# Patient Record
Sex: FEMALE | Race: OTHER | NOT HISPANIC OR LATINO | URBAN - METROPOLITAN AREA
[De-identification: names, ages, dates, MRNs, and addresses within clinical notes are randomized per-mention and may not be internally consistent; named-entity substitution may affect disease eponyms.]

---

## 2018-05-19 ENCOUNTER — EMERGENCY (EMERGENCY)
Facility: HOSPITAL | Age: 23
LOS: 1 days | Discharge: HOME | End: 2018-05-19
Attending: EMERGENCY MEDICINE
Payer: MEDICAID

## 2018-05-19 VITALS
HEART RATE: 71 BPM | OXYGEN SATURATION: 100 % | SYSTOLIC BLOOD PRESSURE: 104 MMHG | DIASTOLIC BLOOD PRESSURE: 61 MMHG | RESPIRATION RATE: 18 BRPM | TEMPERATURE: 99 F

## 2018-05-19 VITALS — HEIGHT: 60 IN | WEIGHT: 130.07 LBS

## 2018-05-19 PROCEDURE — 99283 EMERGENCY DEPT VISIT LOW MDM: CPT

## 2018-05-19 RX ORDER — DEXAMETHASONE 0.5 MG/5ML
10 ELIXIR ORAL ONCE
Qty: 0 | Refills: 0 | Status: COMPLETED | OUTPATIENT
Start: 2018-05-19 | End: 2018-05-19

## 2018-05-19 RX ADMIN — Medication 10 MILLIGRAM(S): at 21:53

## 2018-05-19 RX ADMIN — Medication 1 TABLET(S): at 21:52

## 2018-05-19 NOTE — ED PROVIDER NOTE - OBJECTIVE STATEMENT
21 y/o F, presents to the ED c/o throat pain and swelling, onset 2 days ago.  Pt states that pain is worsening and radiating to her ear.  Pt states that she gets strep throat often and current sx are similar.  denies fever. denies HA or neck pain. no chest pain or sob. no abd pain. no n/v/d. no urinary f/u/d. no back pain. no motor or sensory deficits. denies illicit drug use. no recent travel. no rash. no other acute issues symptoms or concerns

## 2019-01-21 ENCOUNTER — RESULT REVIEW (OUTPATIENT)
Age: 24
End: 2019-01-21

## 2021-02-25 PROBLEM — Z00.00 ENCOUNTER FOR PREVENTIVE HEALTH EXAMINATION: Status: ACTIVE | Noted: 2021-02-25

## 2021-02-27 ENCOUNTER — APPOINTMENT (OUTPATIENT)
Dept: ORTHOPEDIC SURGERY | Facility: CLINIC | Age: 26
End: 2021-02-27
Payer: COMMERCIAL

## 2021-02-27 VITALS — TEMPERATURE: 96.3 F

## 2021-02-27 VITALS
HEART RATE: 93 BPM | TEMPERATURE: 98.1 F | HEIGHT: 61 IN | BODY MASS INDEX: 25.49 KG/M2 | SYSTOLIC BLOOD PRESSURE: 101 MMHG | WEIGHT: 135 LBS | DIASTOLIC BLOOD PRESSURE: 63 MMHG

## 2021-02-27 DIAGNOSIS — M24.812 OTHER SPECIFIC JOINT DERANGEMENTS OF LEFT SHOULDER, NOT ELSEWHERE CLASSIFIED: ICD-10-CM

## 2021-02-27 DIAGNOSIS — Z86.2 PERSONAL HISTORY OF DISEASES OF THE BLOOD AND BLOOD-FORMING ORGANS AND CERTAIN DISORDERS INVOLVING THE IMMUNE MECHANISM: ICD-10-CM

## 2021-02-27 DIAGNOSIS — Z78.9 OTHER SPECIFIED HEALTH STATUS: ICD-10-CM

## 2021-02-27 DIAGNOSIS — Z86.59 PERSONAL HISTORY OF OTHER MENTAL AND BEHAVIORAL DISORDERS: ICD-10-CM

## 2021-02-27 DIAGNOSIS — S49.92XA UNSPECIFIED INJURY OF LEFT SHOULDER AND UPPER ARM, INITIAL ENCOUNTER: ICD-10-CM

## 2021-02-27 DIAGNOSIS — Z82.61 FAMILY HISTORY OF ARTHRITIS: ICD-10-CM

## 2021-02-27 DIAGNOSIS — Z82.49 FAMILY HISTORY OF ISCHEMIC HEART DISEASE AND OTHER DISEASES OF THE CIRCULATORY SYSTEM: ICD-10-CM

## 2021-02-27 PROCEDURE — 99204 OFFICE O/P NEW MOD 45 MIN: CPT

## 2021-02-27 PROCEDURE — 99072 ADDL SUPL MATRL&STAF TM PHE: CPT

## 2021-02-27 NOTE — REASON FOR VISIT
[Initial Visit] : an initial visit for [FreeTextEntry2] : Left shoulder pain/injury, acute on chronic.  Pain for over 1 year.

## 2021-02-27 NOTE — PHYSICAL EXAM
[de-identified] : Laterality: Left shoulder\par \par General: Alert and oriented x3.  In no acute distress.  Pleasant in nature with a normal affect.  No apparent respiratory distress. \par Erythema, Warmth, Rubor: Negative\par Swelling: Negative\par \par ROM: (Patient has decreased range of motion in all planes of the shoulder and is guarding during range of motion examination)\par FF: 150 degrees\par ER: 65 degrees\par Abduction: 140 degrees\par IR: Normal\par IR behind back: L5\par \par Special Test:\par Empty Can Sign: Positive.  The patient has severe pain while performing this test.  The patient has severe weakness as well.\par [place]'s Sign: Positive.  The patient has severe pain while performing this test.\par Anderson/Neer Sign: Negative\par Speed's Sign: Negative\par Yergason's Sign: Negative\par Apprehension/Relocation: Negative\par Sulcus Sign: Negative\par Cross Arm Adduction/Pain over AC-J: Negative\par Belly Press/Lift Off Behind Back: Negative\par \par Neurovascularly intact motor and sensory [de-identified] : X-rays of the left shoulder reviewed, 2/27/2021: Negative x-rays of the left shoulder.  No fractures present.

## 2021-02-27 NOTE — DISCUSSION/SUMMARY
[de-identified] : Assessment: Left shoulder injury, trauma.\par \par Plan:\par #1.  MRI of the left shoulder ordered to evaluate for traumatic rotator cuff tear.  The patient has tried and failed conservative management.  The patient has had shoulder pain and injury for over 1 year.\par #2.  Continue with over-the-counter anti-inflammatories and pain medications as needed.\par #3.  Hold off on physical therapy at this time.  I want to evaluate the rotator cuff on MRI before performing physical activities shoulder.  I do want her to continue with range of motion at home as I do not want her to get stiff and become frozen.\par #4.  Continue ice and heat therapy.\par #5.  Referral to meet with sports medicine, Dr. Arteaga, post MRI to review.\par #6.  All of her questions were answered.  She understood the treatment course at this time.
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
## 2021-03-12 ENCOUNTER — OUTPATIENT (OUTPATIENT)
Dept: OUTPATIENT SERVICES | Facility: HOSPITAL | Age: 26
LOS: 1 days | End: 2021-03-12
Payer: COMMERCIAL

## 2021-03-12 ENCOUNTER — APPOINTMENT (OUTPATIENT)
Dept: MRI IMAGING | Facility: CLINIC | Age: 26
End: 2021-03-12
Payer: COMMERCIAL

## 2021-03-12 DIAGNOSIS — M24.812 OTHER SPECIFIC JOINT DERANGEMENTS OF LEFT SHOULDER, NOT ELSEWHERE CLASSIFIED: ICD-10-CM

## 2021-03-12 DIAGNOSIS — M25.512 PAIN IN LEFT SHOULDER: ICD-10-CM

## 2021-03-12 PROCEDURE — 73221 MRI JOINT UPR EXTREM W/O DYE: CPT | Mod: 26,LT

## 2021-03-12 PROCEDURE — 73221 MRI JOINT UPR EXTREM W/O DYE: CPT

## 2021-03-15 ENCOUNTER — APPOINTMENT (OUTPATIENT)
Dept: ORTHOPEDIC SURGERY | Facility: CLINIC | Age: 26
End: 2021-03-15

## 2021-04-05 ENCOUNTER — APPOINTMENT (OUTPATIENT)
Dept: ORTHOPEDIC SURGERY | Facility: CLINIC | Age: 26
End: 2021-04-05

## 2021-04-26 ENCOUNTER — APPOINTMENT (OUTPATIENT)
Dept: ORTHOPEDIC SURGERY | Facility: CLINIC | Age: 26
End: 2021-04-26
Payer: COMMERCIAL

## 2021-04-26 VITALS
HEIGHT: 61 IN | WEIGHT: 135 LBS | SYSTOLIC BLOOD PRESSURE: 96 MMHG | HEART RATE: 63 BPM | BODY MASS INDEX: 25.49 KG/M2 | DIASTOLIC BLOOD PRESSURE: 59 MMHG

## 2021-04-26 DIAGNOSIS — M25.512 PAIN IN LEFT SHOULDER: ICD-10-CM

## 2021-04-26 DIAGNOSIS — Z78.9 OTHER SPECIFIED HEALTH STATUS: ICD-10-CM

## 2021-04-26 PROCEDURE — 99213 OFFICE O/P EST LOW 20 MIN: CPT

## 2021-04-26 PROCEDURE — 99072 ADDL SUPL MATRL&STAF TM PHE: CPT

## 2021-04-26 RX ORDER — METHYLPREDNISOLONE 4 MG/1
4 TABLET ORAL
Qty: 1 | Refills: 0 | Status: ACTIVE | COMMUNITY
Start: 2021-04-26 | End: 1900-01-01

## 2021-07-31 ENCOUNTER — TRANSCRIPTION ENCOUNTER (OUTPATIENT)
Age: 26
End: 2021-07-31

## 2022-01-31 ENCOUNTER — APPOINTMENT (OUTPATIENT)
Dept: ORTHOPEDIC SURGERY | Facility: CLINIC | Age: 27
End: 2022-01-31
Payer: COMMERCIAL

## 2022-01-31 VITALS
WEIGHT: 140 LBS | HEIGHT: 61 IN | SYSTOLIC BLOOD PRESSURE: 95 MMHG | DIASTOLIC BLOOD PRESSURE: 64 MMHG | HEART RATE: 72 BPM | BODY MASS INDEX: 26.43 KG/M2

## 2022-01-31 DIAGNOSIS — M53.3 SACROCOCCYGEAL DISORDERS, NOT ELSEWHERE CLASSIFIED: ICD-10-CM

## 2022-01-31 PROCEDURE — 73562 X-RAY EXAM OF KNEE 3: CPT | Mod: LT,RT

## 2022-01-31 PROCEDURE — 99213 OFFICE O/P EST LOW 20 MIN: CPT

## 2022-01-31 NOTE — HISTORY OF PRESENT ILLNESS
[Pain Location] : pain [] : right & left knee [Worsening] : worsening [Intermit.] : ~He/She~ states the symptoms seem to be intermittent [Direct Pressure] : worsened by direct pressure [Running] : worsened by running [Walking] : worsened by walking [Knee Flexion] : worsened with knee flexion [Knee Extension] : worsened with knee extension [Rest] : relieved by rest [de-identified] : 1/31/22: Rosy is a pleasant 26-year-old female who returns to the office today with new complaints of bilateral knee pain and left hip pain.  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  When she was in college she was being treated by her  with therapy and exercise which helped to manage the problem and get her through her  swim competitions.  She now tries to remain active in the gym and has noticed worsening pain in her knees, particularly when using a stationary bike which she rides twice per week.  She states that she keeps her seat relatively low when she is riding.  Hip is also started to become a problem.  She denies any new injuries.  She has not had any recent therapy and has never had any injections to these areas before.  She denies any swelling or mechanical symptoms other than occasional clicking underneath her left kneecap.  The patient denies any fevers, chills, sweats, recent illnesses, numbness, tingling, weakness, or pain elsewhere at this time.\par \par 4/26/21: Rosy is a pleasant 25 year old female HD[R] who presents with Left shoulder pain.  Patient states that she has had pain in her left shoulder for the past couple of years but it is gotten acutely worse after recent snowboarding injury in February.  Her pain is activity related and alleviated by rest.  Hurts when she lifts her arm overhead or behind her.  She saw HERIBERTO Ryder who is concerned for a possible rotator cuff injury and ordered an MRI of the shoulder.  She denies any dislocation events that she can recall.  She is not currently taking anything for pain.  She does have an allergy to Aleve and other NSAIDs.  She has had no therapy or injections before.  The patient denies any fevers, chills, sweats, recent illnesses, numbness, tingling, weakness, or pain elsewhere at this time.\par 	\par She denies prior injury.\par She denies paresthesia.\par She denies night symptoms.\par Symptoms worsen with activity.\par She is not diabetic.\par She denies history of gastric problems.\par

## 2022-01-31 NOTE — PHYSICAL EXAM
[de-identified] : General:\par Awake, alert, no acute distress, Patient was cooperative and appropriate during the examination.\par \par The patient is of normal weight for height and age.\par \par Ambulates without an antalgic gait.\par \par Full, painless range of motion of the neck and back.\par \par Exam of the bilateral lower extremities is intact and symmetric with regards to dermatologic, vascular, and neurologic exam. Bilateral lower extremity sensation is grossly intact to light touch in the DP/SP/T/S/S nerve distributions. Intact DF/PF/EHL. BIlateral lower extremity warm and well-perfused with brisk capillary refill.\par \par Pulmonary:\par Regular, nonlabored breathing\par \par Abdomen:\par Soft, nontender, nondistended.\par \par Lymphatic:\par No evidence of inguinal lymphadenopathy\par \par Bilateral Knee Examination:\par Physical examination of the knees demonstrates normal skin without signs of skin changes or abnormalities. No erythema, warmth, or joint effusion is appreciated. \par \par Sensation is intact to light touch L2-S1\par Palpable DP/PT pulse\par EHL/FHL/TA/GSC motor function intact\par \par Range of Motion\par 0 to 130 degrees bilaterally\par \par Strength Testing\par Quadriceps/Hamstrings 5/5 bilaterally\par Patient is able to perform a straight leg raise bilaterally without difficulty.\par \par Palpation\par Not tender to palpation about the distal femurs, proximal tibias\par Mildly tender to palpation about the inferior poles of the patella, worse on the left\par No palpable defect appreciated in the quadriceps or patellar tendons\par Not tender to palpation of medial joint lines\par Not tender to palpation of lateral joint lines\par \par Special Tests\par Anterior Drawer negative bilaterally\par Posterior Drawer negative bilaterally\par Lachman Exam negative bilaterally\par No Varus or Valgus Laxity at 0 or 30 degrees of knee flexion bilaterally\par Lynsey's Test negative bilaterally\par Active compression of the patella positive for mild pain bilaterally\par Translation of the patella less than 2 quadrants with firm endpoints bilaterally\par \par \par Left hip Examination:\par Physical examination of the hip demonstrates normal skin without signs of skin changes or abnormalities. No erythema, warmth, or joint effusion is appreciated.\par \par Sensation is intact to light touch L2-S1\par Palpable DP/PT pulse\par EHL/FHL/TA/GSC motor function intact\par \par Range of Motion\par 105 degrees of flexion to full extension\par 45 degrees of internal rotation\par 60 degrees of external rotation\par \par Strength Testing\par Hip Flexors/Hip Extensors 5/5\par Hip Abductors/Hip Adductors 5/5\par Quadriceps/Hamstring 5/5\par Patient is able to perform a straight leg raise without difficulty.\par \par Palpation\par Not tender to palpation about the greater trochanter\par Not tender to palpation about the hip joint\par Mildly tender palpation about the left SI joint\par \par Special Tests\par PEACE test negative\par FADIR test negative\par Priya test negative\par Straight leg raise test negative\par \par \par \par \par \par \par \par \par  [de-identified] : X-rays including 2 views of the bilateral knees from  radiology reviewed the patient today in the office.  There is no acute fracture or dislocation.  No arthritis.

## 2022-01-31 NOTE — CONSULT LETTER
[Dear  ___] : Dear ~AMADOR, [Consult Letter:] : I had the pleasure of evaluating your patient, [unfilled]. [( Thank you for referring [unfilled] for consultation for _____ )] : Thank you for referring [unfilled] for consultation for [unfilled] [Please see my note below.] : Please see my note below. [Consult Closing:] : Thank you very much for allowing me to participate in the care of this patient.  If you have any questions, please do not hesitate to contact me. [Sincerely,] : Sincerely, [FreeTextEntry2] : Melinda Jimenez [FreeTextEntry3] : Gonzalez Arteaga DO.\par Sports Medicine \par Orthopaedic Surgery\par \par [facility] [place]\par [facility] \par [address] \par [address] \[place] [address]\par \par Tel [phone]\par Fax [phone]\par \par For same day and next day orthopedic appointments contact:\par [email or web address] |[phone])\par Appointments available nights and weekends!  \par \par [facility] [place]\par Visit us at [facility]/orthopaedic\par

## 2022-01-31 NOTE — DISCUSSION/SUMMARY
[de-identified] : Assessment: 26-year-old female with bilateral knee pain secondary to patellofemoral syndrome/patellar tendinitis, left SI joint pain\par \par Plan: I had a long discussion with the patient today regarding the nature of their diagnosis and treatment plan. We discussed the risks and benefits of no treatment as well as nonoperative and operative treatments.  I reviewed the patient's x-rays of her knees today with her in the office which are negative for any acute pathology.  On examination of her knees she has full range of motion without any signs of instability or swelling.  Most of her pain is isolated to the inferior poles of the patella, worse on the left side.  She also has localized tenderness to palpation of the left SI joint.  At this time I recommend conservative treatment of the patient's condition with modalities including rest, ice, heat, anti-inflammatory medications, activity modifications, and home stretching and strengthening exercises daily. I discussed with the patient the risks and benefits associated with NSAID use.  Referral for physical therapy was provided today for both her knees as well as her left hip.  Also talked about making adjustments to her seat height when cycling and exercising.  Recommend follow-up in 8 weeks for repeat evaluation.  If she continues to have pain at the time we will consider advanced imaging.\par \par The patient verbalizes their understanding and agrees with the plan.  All questions were answered to their satisfaction.

## 2022-03-28 ENCOUNTER — APPOINTMENT (OUTPATIENT)
Dept: ORTHOPEDIC SURGERY | Facility: CLINIC | Age: 27
End: 2022-03-28
Payer: COMMERCIAL

## 2022-03-28 VITALS
HEART RATE: 89 BPM | DIASTOLIC BLOOD PRESSURE: 78 MMHG | HEIGHT: 61 IN | WEIGHT: 140 LBS | BODY MASS INDEX: 26.43 KG/M2 | SYSTOLIC BLOOD PRESSURE: 110 MMHG

## 2022-03-28 PROCEDURE — 99213 OFFICE O/P EST LOW 20 MIN: CPT

## 2022-03-28 NOTE — DISCUSSION/SUMMARY
[de-identified] : Assessment: 26-year-old female with bilateral knee pain secondary to patellofemoral syndrome/patellar tendinitis, left SI joint pain\par \par Plan: I had a long discussion with the patient today regarding the nature of their diagnosis and treatment plan. We discussed the risks and benefits of no treatment as well as nonoperative and operative treatments.  I reviewed the patient's x-rays of her knees today with her in the office which are negative for any acute pathology.  At this time due to lack of improvement with physical therapy, anti-inflammatories, rest, ice, other conservative treatments I am recommending an MRI of the bilateral knees for further evaluation.  She will avoid exacerbating activities in the interim and will follow up after the MRIs are complete for repeat evaluation to discuss neck steps.  She will continue with physical therapy at home on her own in the interim.  Patient discussed and reviewed with Dr. Arteaga today.\par \par The patient verbalizes their understanding and agrees with the plan.  All questions were answered to their satisfaction.

## 2022-03-28 NOTE — HISTORY OF PRESENT ILLNESS
[Pain Location] : pain [] : right & left knee [Worsening] : worsening [Intermit.] : ~He/She~ states the symptoms seem to be intermittent [Direct Pressure] : worsened by direct pressure [Running] : worsened by running [Walking] : worsened by walking [Knee Flexion] : worsened with knee flexion [Knee Extension] : worsened with knee extension [Rest] : relieved by rest [de-identified] : 03/28/2022 : ROSY OSMAN  is a 26 year year old female who presents to the office for follow-up evaluation of her bilateral knee pain.  She states that she has done physical therapy, taking over-the-counter anti-inflammatories, rested, iced, heat, avoid exacerbating tibias over the last year and states she still has continued pain into the bilateral knees.  She states is worse with certain movements and alleviated with rest and is mostly over the anterior and medial aspect of the bilateral knees left worse than right.  She states most of the pain in the left knee is over the medial aspect of the knee and anterior aspect of the knee and most of the pain in the right knee is over the superior and inferior aspect of the knee.  She states it can be sharp at times.  She denies any numbness or tingling distally.  She has no other complaints today.  She is here for routine follow-up today.\par \par 1/31/22: Rosy is a pleasant 26-year-old female who returns to the office today with new complaints of bilateral knee pain and left hip pain.  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  When she was in college she was being treated by her  with therapy and exercise which helped to manage the problem and get her through her  swim competitions.  She now tries to remain active in the gym and has noticed worsening pain in her knees, particularly when using a stationary bike which she rides twice per week.  She states that she keeps her seat relatively low when she is riding.  Hip is also started to become a problem.  She denies any new injuries.  She has not had any recent therapy and has never had any injections to these areas before.  She denies any swelling or mechanical symptoms other than occasional clicking underneath her left kneecap.  The patient denies any fevers, chills, sweats, recent illnesses, numbness, tingling, weakness, or pain elsewhere at this time.\par \par 4/26/21: Rosy is a pleasant 25 year old female HD[R] who presents with Left shoulder pain.  Patient states that she has had pain in her left shoulder for the past couple of years but it is gotten acutely worse after recent snowboarding injury in February.  Her pain is activity related and alleviated by rest.  Hurts when she lifts her arm overhead or behind her.  She saw HERIBERTO Ryder who is concerned for a possible rotator cuff injury and ordered an MRI of the shoulder.  She denies any dislocation events that she can recall.  She is not currently taking anything for pain.  She does have an allergy to Aleve and other NSAIDs.  She has had no therapy or injections before.  The patient denies any fevers, chills, sweats, recent illnesses, numbness, tingling, weakness, or pain elsewhere at this time.\par 	\par She denies prior injury.\par She denies paresthesia.\par She denies night symptoms.\par Symptoms worsen with activity.\par She is not diabetic.\par She denies history of gastric problems.\par

## 2022-03-28 NOTE — REVIEW OF SYSTEMS
[Joint Pain] : joint pain [Negative] : Heme/Lymph [FreeTextEntry9] : bilateral knee pain, left hip pain

## 2022-03-28 NOTE — PHYSICAL EXAM
[de-identified] : General:\par Awake, alert, no acute distress, Patient was cooperative and appropriate during the examination.\par \par The patient is of normal weight for height and age.\par \par Ambulates without an antalgic gait.\par \par Full, painless range of motion of the neck and back.\par \par Exam of the bilateral lower extremities is intact and symmetric with regards to dermatologic, vascular, and neurologic exam. Bilateral lower extremity sensation is grossly intact to light touch in the DP/SP/T/S/S nerve distributions. Intact DF/PF/EHL. BIlateral lower extremity warm and well-perfused with brisk capillary refill.\par \par Pulmonary:\par Regular, nonlabored breathing\par \par Abdomen:\par Soft, nontender, nondistended.\par \par Lymphatic:\par No evidence of inguinal lymphadenopathy\par \par Bilateral Knee Examination:\par Physical examination of the knees demonstrates normal skin without signs of skin changes or abnormalities. No erythema, warmth, or joint effusion is appreciated. \par \par Sensation is intact to light touch L2-S1\par Palpable DP/PT pulse\par EHL/FHL/TA/GSC motor function intact\par \par Range of Motion\par 0 to 130 degrees bilaterally\par \par Strength Testing\par Quadriceps/Hamstrings 5/5 bilaterally\par Patient is able to perform a straight leg raise bilaterally without difficulty.\par \par Palpation\par Not tender to palpation about the distal femurs, proximal tibias\par Mildly tender to palpation about the inferior poles of the patella, superior aspect of the patella right worse than left\par No palpable defect appreciated in the quadriceps or patellar tendons\par Bilaterally mildly tender to palpation of medial joint lines\par Mildly tender to palpation of lateral joint line on the left knee\par Mildly tender in the patellofemoral compartment of the left knee\par \par Special Tests\par Anterior Drawer negative bilaterally\par Posterior Drawer negative bilaterally\par Lachman Exam negative bilaterally\par No Varus or Valgus Laxity at 0 or 30 degrees of knee flexion bilaterally\par Lynsey's Test negative bilaterally\par Active compression of the patella positive for mild pain bilaterally\par Translation of the patella less than 2 quadrants with firm endpoints bilaterally [de-identified] : X-rays including 2 views of the bilateral knees from  radiology reviewed the patient today in the office.  There is no acute fracture or dislocation.  No arthritis.

## 2022-03-28 NOTE — CONSULT LETTER
[Dear  ___] : Dear ~AMADOR, [Consult Letter:] : I had the pleasure of evaluating your patient, [unfilled]. [( Thank you for referring [unfilled] for consultation for _____ )] : Thank you for referring [unfilled] for consultation for [unfilled] [Please see my note below.] : Please see my note below. [Consult Closing:] : Thank you very much for allowing me to participate in the care of this patient.  If you have any questions, please do not hesitate to contact me. [Sincerely,] : Sincerely, [FreeTextEntry2] : Melinda Jimenez [FreeTextEntry3] : Gonzalez Arteaga DO.\par Sports Medicine \par Orthopaedic Surgery\par \par [facility] [place]\par [facility] \par [address] \[address] \[place] [address]\par \par Tel [phone]\par Fax [phone]\par \par For same day and next day orthopedic appointments contact:\par [email or web address] |[phone])\par Appointments available nights and weekends!  \par \par [facility] [place]\par Visit us at [facility]/orthopaedic\par

## 2022-04-01 ENCOUNTER — RESULT REVIEW (OUTPATIENT)
Age: 27
End: 2022-04-01

## 2022-04-07 ENCOUNTER — RESULT REVIEW (OUTPATIENT)
Age: 27
End: 2022-04-07

## 2022-04-07 ENCOUNTER — APPOINTMENT (OUTPATIENT)
Dept: MRI IMAGING | Facility: CLINIC | Age: 27
End: 2022-04-07
Payer: COMMERCIAL

## 2022-04-07 ENCOUNTER — OUTPATIENT (OUTPATIENT)
Dept: OUTPATIENT SERVICES | Facility: HOSPITAL | Age: 27
LOS: 1 days | End: 2022-04-07
Payer: COMMERCIAL

## 2022-04-07 DIAGNOSIS — M25.561 PAIN IN RIGHT KNEE: ICD-10-CM

## 2022-04-07 PROCEDURE — 73721 MRI JNT OF LWR EXTRE W/O DYE: CPT | Mod: 26,RT

## 2022-04-07 PROCEDURE — 73721 MRI JNT OF LWR EXTRE W/O DYE: CPT

## 2022-05-02 ENCOUNTER — APPOINTMENT (OUTPATIENT)
Dept: ORTHOPEDIC SURGERY | Facility: CLINIC | Age: 27
End: 2022-05-02
Payer: COMMERCIAL

## 2022-05-02 VITALS
HEIGHT: 61 IN | DIASTOLIC BLOOD PRESSURE: 59 MMHG | HEART RATE: 74 BPM | SYSTOLIC BLOOD PRESSURE: 97 MMHG | WEIGHT: 140 LBS | BODY MASS INDEX: 26.43 KG/M2

## 2022-05-02 DIAGNOSIS — M25.562 PAIN IN RIGHT KNEE: ICD-10-CM

## 2022-05-02 DIAGNOSIS — M25.561 PAIN IN RIGHT KNEE: ICD-10-CM

## 2022-05-02 PROCEDURE — 20610 DRAIN/INJ JOINT/BURSA W/O US: CPT | Mod: LT

## 2022-05-02 PROCEDURE — 99213 OFFICE O/P EST LOW 20 MIN: CPT | Mod: 25

## 2022-05-02 NOTE — PROCEDURE
[de-identified] : I injected the patient's left knee today with cortisone.\par  \par I discussed at length with the patient the planned steroid and lidocaine injection. The risks, benefits, convalescence and alternatives were reviewed. The possible side effects discussed included but were not limited to: pain, swelling, heat, bleeding, and redness. Symptoms are generally mild but if they are extensive then contact the office. Giving pain relievers by mouth such as NSAIDs or Tylenol can generally treat the reactions to steroid and lidocaine. Rare cases of infection have been noted. Rash, hives and itching may occur post injection. If you have muscle pain or cramps, flushing and or swelling of the face, rapid heart beat, nausea, dizziness, fever, chills, headache, difficulty breathing, swelling in the arms or legs, or have a prickly feeling of your skin, contact a health care provider immediately. Following this discussion, the knee was prepped with Chlorhexidine and Alcohol and under sterile conditions the 80 mg Depo-Medrol and 4 cc Lidocaine injection was performed with a 22 gauge needle through a anterolateral injection site. The needle was introduced into the joint, aspiration was performed to ensure intra-articular placement and the medication was injected. Upon withdrawal of the needle the site was cleaned with alcohol and a band aid applied. The patient tolerated the injection well and there were no adverse effects. Post injection instructions included no strenuous activity for 24 hours, cryotherapy and if there are any adverse effects to contact the office.

## 2022-05-02 NOTE — HISTORY OF PRESENT ILLNESS
[Pain Location] : pain [] : right & left knee [Worsening] : worsening [Intermit.] : ~He/She~ states the symptoms seem to be intermittent [Direct Pressure] : worsened by direct pressure [Running] : worsened by running [Walking] : worsened by walking [Knee Flexion] : worsened with knee flexion [Knee Extension] : worsened with knee extension [Rest] : relieved by rest [de-identified] : 05/02/2022 : ROSY OSMAN  is a 26 year year old female who presents to the office for follow-up of the bilateral knees.  She states she has little to no pain about her right knee now but still has pain about her left knee that is mostly medial and is worse with certain activities and alleviated with rest.  She states she been working with physical therapist and  for strengthening and has been using a brace intermittently which gives some relief.  She states she is here for MRI follow-up today.  She denies any numbness or tingling distally.  She has no other complaints today.\par \par 03/28/2022 : ROSY OSMAN  is a 26 year year old female who presents to the office for follow-up evaluation of her bilateral knee pain.  She states that she has done physical therapy, taking over-the-counter anti-inflammatories, rested, iced, heat, avoid exacerbating tibias over the last year and states she still has continued pain into the bilateral knees.  She states is worse with certain movements and alleviated with rest and is mostly over the anterior and medial aspect of the bilateral knees left worse than right.  She states most of the pain in the left knee is over the medial aspect of the knee and anterior aspect of the knee and most of the pain in the right knee is over the superior and inferior aspect of the knee.  She states it can be sharp at times.  She denies any numbness or tingling distally.  She has no other complaints today.  She is here for routine follow-up today.\par \par 1/31/22: Rosy is a pleasant 26-year-old female who returns to the office today with new complaints of bilateral knee pain and left hip pain.  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  When she was in college she was being treated by her  with therapy and exercise which helped to manage the problem and get her through her  swim competitions.  She now tries to remain active in the gym and has noticed worsening pain in her knees, particularly when using a stationary bike which she rides twice per week.  She states that she keeps her seat relatively low when she is riding.  Hip is also started to become a problem.  She denies any new injuries.  She has not had any recent therapy and has never had any injections to these areas before.  She denies any swelling or mechanical symptoms other than occasional clicking underneath her left kneecap.  The patient denies any fevers, chills, sweats, recent illnesses, numbness, tingling, weakness, or pain elsewhere at this time.\par \par 4/26/21: Rosy is a pleasant 25 year old female HD[R] who presents with Left shoulder pain.  Patient states that she has had pain in her left shoulder for the past couple of years but it is gotten acutely worse after recent snowboarding injury in February.  Her pain is activity related and alleviated by rest.  Hurts when she lifts her arm overhead or behind her.  She saw HERIBERTO Ryder who is concerned for a possible rotator cuff injury and ordered an MRI of the shoulder.  She denies any dislocation events that she can recall.  She is not currently taking anything for pain.  She does have an allergy to Aleve and other NSAIDs.  She has had no therapy or injections before.  The patient denies any fevers, chills, sweats, recent illnesses, numbness, tingling, weakness, or pain elsewhere at this time.\par 	\par She denies prior injury.\par She denies paresthesia.\par She denies night symptoms.\par Symptoms worsen with activity.\par She is not diabetic.\par She denies history of gastric problems.\par

## 2022-05-02 NOTE — REVIEW OF SYSTEMS
[Joint Pain] : joint pain [Joint Stiffness] : joint stiffness [Joint Swelling] : joint swelling [Negative] : Heme/Lymph [Chills] : no chills [Discharge] : no discharge [Cough] : no cough [FreeTextEntry9] : Bilateral knee pain

## 2022-05-02 NOTE — DISCUSSION/SUMMARY
[de-identified] : Assessment: 26-year-old female with bilateral knee pain secondary to patellofemoral syndrome/patellar tendinitis, left SI joint pain\par \par Plan: I had a long discussion with the patient today regarding the nature of their diagnosis and treatment plan. We discussed the risks and benefits of no treatment as well as nonoperative and operative treatments.  I reviewed the patient's MRI which showed a old healed medial meniscus tear of the left knee and no other acute pathology in the right knee.  At this time I am recommending she continue with physical therapy for strengthening of the VMO and quadricep musculature,bracing, over-the-counter anti-inflammatories, ice, heat, rest, avoiding exacerbating activities.  She will continue work with her .  She is requesting a cortisone injection today of the left knee which I feel is reasonable.  She tolerated the procedure well with no adverse effects.  I advised her that I would not continue to repeat cortisone injections given her age we discussed potential gel injections in the future however this is not covered by her insurance.  She will follow-up in 8 weeks for repeat evaluation.  Patient discussed and reviewed with Dr. Arteaga today.\par \par The patient verbalizes their understanding and agrees with the plan.  All questions were answered to their satisfaction.

## 2022-05-02 NOTE — CONSULT LETTER
[Dear  ___] : Dear ~AMADOR, [Consult Letter:] : I had the pleasure of evaluating your patient, [unfilled]. [( Thank you for referring [unfilled] for consultation for _____ )] : Thank you for referring [unfilled] for consultation for [unfilled] [Please see my note below.] : Please see my note below. [Consult Closing:] : Thank you very much for allowing me to participate in the care of this patient.  If you have any questions, please do not hesitate to contact me. [Sincerely,] : Sincerely, [FreeTextEntry2] : Melinda Jimenez [FreeTextEntry3] : Gonzalez Arteaga DO.\par Sports Medicine \par Orthopaedic Surgery\par \par [facility]\par [facility] \par [address] \par [address] \[place] [address]\par \par Tel [phone]\par Fax [phone]\par \par For same day and next day orthopedic appointments contact:\par [email or web address] |[phone])\par Appointments available nights and weekends!  \par \par [facility]\par Visit us at [facility]/orthopaedic\par

## 2022-06-27 ENCOUNTER — APPOINTMENT (OUTPATIENT)
Dept: ORTHOPEDIC SURGERY | Facility: CLINIC | Age: 27
End: 2022-06-27

## 2023-01-03 ENCOUNTER — NON-APPOINTMENT (OUTPATIENT)
Age: 28
End: 2023-01-03

## 2023-07-03 ENCOUNTER — OFFICE (OUTPATIENT)
Dept: URBAN - METROPOLITAN AREA CLINIC 12 | Facility: CLINIC | Age: 28
Setting detail: OPHTHALMOLOGY
End: 2023-07-03
Payer: COMMERCIAL

## 2023-07-03 DIAGNOSIS — H16.252: ICD-10-CM

## 2023-07-03 PROCEDURE — 99203 OFFICE O/P NEW LOW 30 MIN: CPT | Performed by: OPHTHALMOLOGY

## 2023-07-03 ASSESSMENT — VISUAL ACUITY
OS_BCVA: 20/20-2
OD_BCVA: 20/20-

## 2023-07-03 ASSESSMENT — CONFRONTATIONAL VISUAL FIELD TEST (CVF)
OD_FINDINGS: FULL
OS_FINDINGS: FULL

## 2023-07-06 ENCOUNTER — OFFICE (OUTPATIENT)
Dept: URBAN - METROPOLITAN AREA CLINIC 12 | Facility: CLINIC | Age: 28
Setting detail: OPHTHALMOLOGY
End: 2023-07-06
Payer: COMMERCIAL

## 2023-07-06 DIAGNOSIS — H16.252: ICD-10-CM

## 2023-07-06 PROCEDURE — 99212 OFFICE O/P EST SF 10 MIN: CPT | Performed by: OPHTHALMOLOGY

## 2023-07-06 ASSESSMENT — KERATOMETRY
OS_K2POWER_DIOPTERS: 45.00
OD_K2POWER_DIOPTERS: 44.00
OD_K1POWER_DIOPTERS: 43.00
OS_K1POWER_DIOPTERS: 43.25
OD_AXISANGLE_DEGREES: 089
OS_AXISANGLE_DEGREES: 078

## 2023-07-06 ASSESSMENT — REFRACTION_AUTOREFRACTION
OS_AXIS: 161
OS_SPHERE: -4.50
OS_CYLINDER: -1.50
OD_CYLINDER: -0.75
OD_SPHERE: -5.00
OD_AXIS: 021

## 2023-07-06 ASSESSMENT — CONFRONTATIONAL VISUAL FIELD TEST (CVF)
OS_FINDINGS: FULL
OD_FINDINGS: FULL

## 2023-07-06 ASSESSMENT — AXIALLENGTH_DERIVED
OS_AL: 25.5592
OD_AL: 25.89

## 2023-07-06 ASSESSMENT — VISUAL ACUITY
OS_BCVA: 20/20-2
OD_BCVA: 20/20-

## 2023-07-06 ASSESSMENT — SPHEQUIV_DERIVED
OS_SPHEQUIV: -5.25
OD_SPHEQUIV: -5.375

## 2023-12-28 ENCOUNTER — NON-APPOINTMENT (OUTPATIENT)
Age: 28
End: 2023-12-28

## 2024-12-21 ENCOUNTER — NON-APPOINTMENT (OUTPATIENT)
Age: 29
End: 2024-12-21

## 2025-02-04 ENCOUNTER — NON-APPOINTMENT (OUTPATIENT)
Age: 30
End: 2025-02-04

## 2025-08-14 ENCOUNTER — NON-APPOINTMENT (OUTPATIENT)
Age: 30
End: 2025-08-14